# Patient Record
(demographics unavailable — no encounter records)

---

## 2025-02-13 NOTE — HISTORY OF PRESENT ILLNESS
[de-identified] : Date of Injury/Onset: 1 month ago Pain: At Rest: 0 With Activity: 3 Mechanism of injury: This is NOT a Work Related Injury being treated under Worker's Compensation. This is NOT an athletic injury occurring associated with an interscholastic or organized sports team. Quality of symptoms: Improves with: Worse with: Prior treatment: Prior Imaging: n/a Reports Available For Review Today: no Out of work/sport: not working   02/13/2025 OMEGA is here today for evaluation of right knee. Patient reports he [place]pped and [place] about [place] Patient complains of pain along anterior knee. He notes pain is worse when squatting, getting up from sitting. He notes occsaionlly buckling of knee. Patient denies N/T. Patient has been taking Advil as needed for pain relief.

## 2025-02-13 NOTE — DISCUSSION/SUMMARY
[de-identified] : Due to the patients mechanical symptoms along with medial joint line pain, effusion, and pos mary test on exam we will get an mri to eval for medial meniscus tear    - The patient was advised of the diagnosis.  The natural history of the pathology was explained to the patient in layman's terms.  Several different treatment options were discussed and explained including the risks and benefits of both surgical and non-surgical treatments.  - The patient was advised to apply ice (wrapped in a towel or protective covering) to the area daily (20 minutes at a time, 2-4X/day).  - The patient was advised to modify their activities.  - f/u after mri

## 2025-02-13 NOTE — IMAGING
[de-identified] : RIGHT KNEE  Inspection:  mild effusion  Palpation: medial joint line tenderness   Knee Range of Motion:  0-130   Strength: 5/5 Quadriceps strength, 5/5 Hamstring strength  Neurological: light touch is intact throughout  Ligament Stability and Special Tests:   McMurrays: Positive  Lachman: neg  Pivot Shift: neg  Posterior Drawer: neg  Valgus: neg  Varus: neg  Patella Apprehension: neg  Patella Maltracking: neg

## 2025-02-13 NOTE — DATA REVIEWED
[FreeTextEntry1] : Right  X-Ray Examination of the KNEE (4 views): there are no fractures, subluxations or dislocations.

## 2025-03-13 NOTE — HISTORY OF PRESENT ILLNESS
[de-identified] : Date of Injury/Onset: 1 month ago Pain: At Rest: 0 With Activity: 3 Mechanism of injury: This is NOT a Work Related Injury being treated under Worker's Compensation. This is NOT an athletic injury occurring associated with an interscholastic or organized sports team. Quality of symptoms: Improves with: Worse with: Prior treatment: Prior Imaging: n/a Reports Available For Review Today: no Out of work/sport: not working   02/13/2025 OMEGA is here today for evaluation of right knee. Patient reports he tripped and fell about 1 [place] the [place]. Patient complains of pain along anterior knee. He notes pain is worse when squatting, getting up from sitting. He notes occsaionlly buckling of knee. Patient denies N/T. Patient has been taking Advil as needed for pain relief.   03/13/2025 OMEGA  is here today to follow up on MRI results

## 2025-03-13 NOTE — DATA REVIEWED
[FreeTextEntry1] : Right  X-Ray Examination of the KNEE (4 views): there are no fractures, subluxations or dislocations.      MRI of right knee [facility] 2/2025 impression degenerative signal at medial meniscus wihtout evidence of tear.  findings compatible with an old partial tear of distal ACL.  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  small knee effusion

## 2025-03-13 NOTE — DISCUSSION/SUMMARY
[de-identified] : Due to the patients mechanical symptoms along with medial joint line pain, effusion, and pos mary test on exam we will get an mri to eval for medial meniscus tear    - The patient was advised of the diagnosis.  The natural history of the pathology was explained to the patient in layman's terms.  Several different treatment options were discussed and explained including the risks and benefits of both surgical and non-surgical treatments.  - The patient was advised to apply ice (wrapped in a towel or protective covering) to the area daily (20 minutes at a time, 2-4X/day).  - The patient was advised to modify their activities.  - f/u after mri ***   - We discussed their diagnosis and treatment options at length including the risks and benefits of both surgical treatment and non-surgical options.  - We will continue conservative treatment with activity modification, PT, icing, weight loss, and anti-inflammatory medications.  - The patient was provided with a PT prescription to work on ROM, hip ER/abductors strengthening, quad/hamstring stretches and strengthening, and other exercises.  - The patient was advised to let pain guide the gradual advancement of activities.  - We discussed the possible of injections in the future.  - Follow up as needed in 6 weeks as to re-evaluate  rtc 6 weeks  next visit: consider csi

## 2025-03-13 NOTE — IMAGING
[de-identified] : RIGHT KNEE  Inspection:  minimal effusion  Palpation: medial facet of the patella tenderness   Knee Range of Motion:  0-135  Strength: 5/5 Quadriceps strength, 5/5 Hamstring strength, 4/5 Hip Abductor strength  Neurological: light touch is intact throughout  Ligament Stability and Special Tests:   McMurrays: neg  Lachman: neg  Pivot Shift: neg  Posterior Drawer: neg  Valgus: neg  Varus: neg  Patella Apprehension: neg  Patella Maltracking: neg